# Patient Record
Sex: FEMALE | Race: WHITE | Employment: FULL TIME
[De-identification: names, ages, dates, MRNs, and addresses within clinical notes are randomized per-mention and may not be internally consistent; named-entity substitution may affect disease eponyms.]

---

## 2024-02-20 ENCOUNTER — HOSPITAL ENCOUNTER (OUTPATIENT)
Facility: HOSPITAL | Age: 49
Setting detail: RECURRING SERIES
Discharge: HOME | End: 2024-02-23
Payer: COMMERCIAL

## 2024-02-20 PROCEDURE — 97161 PT EVAL LOW COMPLEX 20 MIN: CPT

## 2024-02-20 NOTE — PROGRESS NOTES
PHYSICAL / OCCUPATIONAL THERAPY - DAILY TREATMENT NOTE    Patient Name: Meg Souza    Date: 2024    : 1975  Insurance: Payor: TANYA / Plan: TANYA POS / Product Type: *No Product type* /      Patient  verified YES   Visit #   Current / Total 1 8   Time   In / Out 10:20 11:00   Pain   In / Out 0/10 0/10   Subjective Functional Status/Changes: SEE POC     TREATMENT AREA =  Pain in right shoulder [M25.511]    OBJECTIVE  33 untimed eval    Therapeutic Procedures:    Tx Min Billable or 1:1 Min (if diff from Tx Min) Procedure, Rationale, Specifics   7NB  96173 Therapeutic Exercise (timed):  increase ROM, strength, coordination, balance, and proprioception to improve patient's ability to progress to PLOF and address remaining functional goals. (see flow sheet as applicable)     Details if applicable:              Details if applicable:            Details if applicable:            Details if applicable:            Details if applicable:     7NB  [facility] Totals Reminder: bill using total billable min of TIMED therapeutic procedures (example: do not include dry needle or estim unattended, both untimed codes, in totals to left)  8-22 min = 1 unit; 23-37 min = 2 units; 38-52 min = 3 units; 53-67 min = 4 units; 68-82 min = 5 units   Total Total     [x]  Patient Education billed concurrently with other procedures   [x] Review HEP    [] Progressed/Changed HEP, detail:    [] Other detail:       Objective Information/Functional Measures/Assessment    SEE POC    Patient will continue to benefit from skilled PT / OT services to modify and progress therapeutic interventions, analyze and address functional mobility deficits, analyze and address ROM deficits, analyze and address strength deficits, analyze and address soft tissue restrictions, analyze and cue for proper movement patterns, analyze and modify for postural abnormalities, and instruct in home and community integration to address functional deficits and

## 2024-02-20 NOTE — THERAPY EVALUATION
[facility]  [address], [address], [place] [address] Ph:[phone] Fx: [phone]  Plan of Care / Statement of Necessity for Physical Therapy Services     Patient Name: Meg Souza : 1975   Medical   Diagnosis: Pain in right shoulder [M25.511] Treatment Diagnosis:  M25.511  RIGHT SHOULDER PAIN    Onset Date: 5 years ago     Referral Source: Saman Shay MD Start of Care (SOC): 2024   Prior Hospitalization: See medical history Provider #: [id number]   Prior Level of Function: Chronic right shoulder pain, independent with ADLs and recreational activities   Comorbidities: none     Assessment / key information:  Pt is an 48 y.o F who presents to initial PT evaluation with complaints of chronic R shoulder pain for the last 5 years after completing incline push-ups. Signs and symptoms consistent with anterior shoulder instability. Pertinent findings include decreased endurance, instability, and decreased scapular strength. Her pain is limiting her ability to complete ADLs of overhead lifting and ability to enjoy recreational exercise.Pt will benefit with a course of skilled PT to address aforementioned impairments in order to improve quality of life and improve functional mobility to complete ADLs and increase participation at home and the community     Not post operative    Subjective:    Occupation:Self-employed    NGHIA:  Pt reports chronic R shoulder pain since the last 5 years after completing incline push-ups. She reports her right shoulder \" is raising up\". When the pain is at its worse she has trouble sleeping and putting pressure on the R shoulder. She work out 5 day/week. She states the exercises that aggravate her pain are push-ups,overhead activities, and banded abductions, and ball throws in catching motion. She reports she weakness of the R shoulder and compensation of the L shoulder during some exercises.  She denies any

## 2024-03-06 ENCOUNTER — HOSPITAL ENCOUNTER (OUTPATIENT)
Facility: HOSPITAL | Age: 49
Setting detail: RECURRING SERIES
Discharge: HOME | End: 2024-03-09
Payer: COMMERCIAL

## 2024-03-06 PROCEDURE — 97530 THERAPEUTIC ACTIVITIES: CPT

## 2024-03-06 PROCEDURE — 97112 NEUROMUSCULAR REEDUCATION: CPT

## 2024-03-06 PROCEDURE — 97110 THERAPEUTIC EXERCISES: CPT

## 2024-03-06 NOTE — PROGRESS NOTES
with no pain.  EVAL:  pain with 5 pounds  4. Pt will be able complete TFAST assessment with 5-10% difference with no excessive fatigue to demonstrate improved functional endurance to complete household chores.  EVAL: fatigued with CCW/CW testing on R    Next PN/ RC due 3/21/2024  Auth due (visit number/ date) 4/15/2024    PLAN  - Continue Plan of Care  - Upgrade activities as tolerated    Santiago Ha PT    3/6/2024    9:11 AM    Future Appointments   Date Time Provider Department Center   3/6/2024 12:20 PM Santiago Ha PT [facility]   3/13/2024 12:20 PM Santiago Ha PT [facility]   3/20/2024 12:20 PM Santiago Ha PT [facility]   3/27/2024 12:20 PM Santiago Ha PT [facility]

## 2024-03-13 ENCOUNTER — APPOINTMENT (OUTPATIENT)
Facility: HOSPITAL | Age: 49
End: 2024-03-13
Payer: COMMERCIAL

## 2024-03-20 ENCOUNTER — HOSPITAL ENCOUNTER (OUTPATIENT)
Facility: HOSPITAL | Age: 49
Setting detail: RECURRING SERIES
Discharge: HOME | End: 2024-03-23
Payer: COMMERCIAL

## 2024-03-20 PROCEDURE — 97112 NEUROMUSCULAR REEDUCATION: CPT

## 2024-03-20 PROCEDURE — 97530 THERAPEUTIC ACTIVITIES: CPT

## 2024-03-20 PROCEDURE — 97110 THERAPEUTIC EXERCISES: CPT

## 2024-03-20 NOTE — PROGRESS NOTES
PHYSICAL / OCCUPATIONAL THERAPY - DAILY TREATMENT NOTE    Patient Name: Meg Souza    Date: 3/20/2024    : 1975  Insurance: Payor: TANYA / Plan: TANYA POS / Product Type: *No Product type* /      Patient  verified YES   Visit #   Current / Total 3 8   Time   In / Out 12:20 1:00   Pain   In / Out 0/10 0/10   Subjective Functional Status/Changes: Pt reports completing her HEP and being able to complete some burpees and able to lift a 8 pound weight overhead.     TREATMENT AREA =  Pain in right shoulder [M25.511]    OBJECTIVE      Therapeutic Procedures:    Tx Min Billable or 1:1 Min (if diff from Tx Min) Procedure, Rationale, Specifics   15  36581 Therapeutic Exercise (timed):  increase ROM, strength, coordination, balance, and proprioception to improve patient's ability to progress to PLOF and address remaining functional goals. (see flow sheet as applicable)     Details if applicable:       10   48808 Therapeutic Activity (timed):  use of dynamic activities replicating functional movements to increase ROM, strength, coordination, balance, and proprioception in order to improve patient's ability to progress to PLOF and address remaining functional goals.  (see flow sheet as applicable)      Details if applicable:     15  18150 Neuromuscular Re-Education (timed):  improve balance, coordination, kinesthetic sense, posture, core stability and proprioception to improve patient's ability to develop conscious control of individual muscles and awareness of position of extremities in order to progress to PLOF and address remaining functional goals. (see flow sheet as applicable)      Details if applicable:            Details if applicable:            Details if applicable:     40  [facility] Totals Reminder: bill using total billable min of TIMED therapeutic procedures (example: do not include dry needle or estim unattended, both untimed codes, in totals to left)  8-22 min = 1 unit; 23-37 min = 2 units; 38-52

## 2024-03-20 NOTE — THERAPY RECERTIFICATION
[facility]  [address], [address],[place] [address] - Ph: [phone]  Fx: [phone]  PHYSICAL THERAPY PROGRESS NOTE  Patient Name: Meg Souza : 1975   Treatment/Medical Diagnosis: Pain in right shoulder [M25.511]   Referral Source: Saman Shay MD     Date of Initial Visit: 2024 Attended Visits: 3 Missed Visits: 1     SUMMARY OF TREATMENT  Pt has been evaluated/assessed and participated in skilled therapeutic exercise, functional activity training,?neuromuscular facilitation and coordination training, patient education,?and instruction on HEP in order to improve upon shoulder ROM, strength, decreased pain, and return to PLOF.    SUBJECTIVE: Pt reports completing her HEP and being able to complete some burpees and able to lift a 8 pound weight overhead.     CURRENT STATUS  Pt has made fair-good progress in PT as she has only complete 3 visits due to her busy work schedule. Pt report 25-% improvement since beginning therapy. Pt also notes improved ability to complete her workout of overhead lifting and to complete burpees but is not confident in prolonged weightbearing due to pain. Pt demonstrates improved strength to be able to lift a 15 pound object overhead repeatedly without pain. Pt continues to progress with stability exercises to improve NMR and expressed mild pain but resided with rest. Signs and symptoms consistent with shoulder instability.Pt notes having been performing the HEP as prescribed. Pt will continue to benefit from skilled PT to progress exercises and improve upon?functional activities.    OBJECTIVE:   Scapular strength: 4+/5 bilaterally,     1. Independent with HEP.  EVAL: NA  Currently: MET 3/20/2024  2. Decrease max pain 25-50% to assist with ADLs of overhead lifting and weightlifting exercises  EVAL: 7/10  Currently: 0/10 MET 3/20/2024  3. Pt will demonstrate 5/5 scapular strength to improve shoulder

## 2024-03-27 ENCOUNTER — APPOINTMENT (OUTPATIENT)
Facility: HOSPITAL | Age: 49
End: 2024-03-27
Payer: COMMERCIAL